# Patient Record
Sex: MALE | Race: WHITE | NOT HISPANIC OR LATINO | URBAN - METROPOLITAN AREA
[De-identification: names, ages, dates, MRNs, and addresses within clinical notes are randomized per-mention and may not be internally consistent; named-entity substitution may affect disease eponyms.]

---

## 2022-06-02 ENCOUNTER — APPOINTMENT (OUTPATIENT)
Age: 67
Setting detail: DERMATOLOGY
End: 2022-06-07

## 2022-06-02 VITALS — SYSTOLIC BLOOD PRESSURE: 134 MMHG | DIASTOLIC BLOOD PRESSURE: 86 MMHG

## 2022-06-02 DIAGNOSIS — R03.0 ELEVATED BLOOD-PRESSURE READING, WITHOUT DIAGNOSIS OF HYPERTENSION: ICD-10-CM

## 2022-06-02 DIAGNOSIS — L82.0 INFLAMED SEBORRHEIC KERATOSIS: ICD-10-CM

## 2022-06-02 DIAGNOSIS — L82.1 OTHER SEBORRHEIC KERATOSIS: ICD-10-CM

## 2022-06-02 DIAGNOSIS — L57.8 OTHER SKIN CHANGES DUE TO CHRONIC EXPOSURE TO NONIONIZING RADIATION: ICD-10-CM

## 2022-06-02 DIAGNOSIS — D485 NEOPLASM OF UNCERTAIN BEHAVIOR OF SKIN: ICD-10-CM

## 2022-06-02 PROBLEM — D48.5 NEOPLASM OF UNCERTAIN BEHAVIOR OF SKIN: Status: ACTIVE | Noted: 2022-06-02

## 2022-06-02 PROCEDURE — 11301 SHAVE SKIN LESION 0.6-1.0 CM: CPT

## 2022-06-02 PROCEDURE — OTHER DEFER: OTHER

## 2022-06-02 PROCEDURE — OTHER BENIGN DESTRUCTION: OTHER

## 2022-06-02 PROCEDURE — 99203 OFFICE O/P NEW LOW 30 MIN: CPT | Mod: 25

## 2022-06-02 PROCEDURE — 17110 DESTRUCT B9 LESION 1-14: CPT | Mod: 59

## 2022-06-02 PROCEDURE — OTHER COUNSELING: OTHER

## 2022-06-02 PROCEDURE — OTHER SHAVE REMOVAL: OTHER

## 2022-06-02 PROCEDURE — OTHER MIPS QUALITY: OTHER

## 2022-06-02 ASSESSMENT — LOCATION SIMPLE DESCRIPTION DERM
LOCATION SIMPLE: POSTERIOR NECK
LOCATION SIMPLE: LEFT HAND
LOCATION SIMPLE: RIGHT UPPER BACK
LOCATION SIMPLE: LEFT CLAVICULAR SKIN
LOCATION SIMPLE: RIGHT TEMPLE
LOCATION SIMPLE: RIGHT SHOULDER

## 2022-06-02 ASSESSMENT — LOCATION ZONE DERM
LOCATION ZONE: TRUNK
LOCATION ZONE: NECK
LOCATION ZONE: HAND
LOCATION ZONE: FACE
LOCATION ZONE: ARM

## 2022-06-02 ASSESSMENT — LOCATION DETAILED DESCRIPTION DERM
LOCATION DETAILED: RIGHT POSTERIOR SHOULDER
LOCATION DETAILED: RIGHT CENTRAL TEMPLE
LOCATION DETAILED: RIGHT SUPERIOR MEDIAL UPPER BACK
LOCATION DETAILED: LEFT CLAVICULAR SKIN
LOCATION DETAILED: LEFT RADIAL DORSAL HAND
LOCATION DETAILED: LEFT MEDIAL TRAPEZIAL NECK

## 2022-06-02 NOTE — PROCEDURE: MIPS QUALITY
Quality 226: Preventive Care And Screening: Tobacco Use: Screening And Cessation Intervention: Patient screened for tobacco use and is an ex/non-smoker
Detail Level: Detailed
Quality 110: Preventive Care And Screening: Influenza Immunization: Influenza Immunization Administered during Influenza season
Quality 111:Pneumonia Vaccination Status For Older Adults: Pneumococcal vaccine was not administered on or after patient’s 60th birthday and before the end of the measurement period, reason not otherwise specified
Quality 130: Documentation Of Current Medications In The Medical Record: Current Medications Documented

## 2022-06-02 NOTE — PROCEDURE: SHAVE REMOVAL
Medical Necessity Clause: This procedure was medically necessary because the lesion that was treated was:
Bill For Surgical Tray: no
Anesthesia Type: 1% lidocaine with epinephrine
Detail Level: Detailed
Biopsy Method: Dermablade
Post-Care Instructions: I reviewed with the patient in detail post-care instructions. Patient is to keep the biopsy site dry overnight, and then apply bacitracin twice daily until healed. Patient may apply hydrogen peroxide soaks to remove any crusting.
Wound Care: Petrolatum
Size Of Lesion In Cm (Required): 0.6
Notification Instructions: Patient will be notified of pathology results. However, patient instructed to call the office if not contacted within 2 weeks.
Hemostasis: Drysol
Billing Type: Third-Party Bill
Was A Bandage Applied: Yes
Medical Necessity Information: It is in your best interest to select a reason for this procedure from the list below. All of these items fulfill various CMS LCD requirements except the new and changing color options.
Consent was obtained from the patient. The risks and benefits to therapy were discussed in detail. Specifically, the risks of infection, scarring, bleeding, prolonged wound healing, incomplete removal, allergy to anesthesia, nerve injury and recurrence were addressed. Prior to the procedure, the treatment site was clearly identified and confirmed by the patient. All components of Universal Protocol/PAUSE Rule completed.

## 2022-06-02 NOTE — PROCEDURE: BENIGN DESTRUCTION
Add 52 Modifier (Optional): no
Medical Necessity Information: It is in your best interest to select a reason for this procedure from the list below. All of these items fulfill various CMS LCD requirements except the new and changing color options.
Medical Necessity Clause: This procedure was medically necessary because the lesions that were treated were:
Treatment Number (Will Not Render If 0): 0
Post-Care Instructions: I reviewed with the patient in detail post-care instructions. Patient is to wear sunprotection, and avoid picking at any of the treated lesions. Pt may apply Vaseline to crusted or scabbing areas.
Detail Level: Detailed
Consent: The patient's consent was obtained including but not limited to risks of crusting, scabbing, blistering, scarring, darker or lighter pigmentary change, recurrence, incomplete removal and infection.

## 2023-06-29 ENCOUNTER — APPOINTMENT (OUTPATIENT)
Dept: URBAN - METROPOLITAN AREA CLINIC 228 | Age: 68
Setting detail: DERMATOLOGY
End: 2023-07-01

## 2023-06-29 DIAGNOSIS — D22 MELANOCYTIC NEVI: ICD-10-CM

## 2023-06-29 DIAGNOSIS — L57.8 OTHER SKIN CHANGES DUE TO CHRONIC EXPOSURE TO NONIONIZING RADIATION: ICD-10-CM

## 2023-06-29 DIAGNOSIS — D49.2 NEOPLASM OF UNSPECIFIED BEHAVIOR OF BONE, SOFT TISSUE, AND SKIN: ICD-10-CM

## 2023-06-29 DIAGNOSIS — L72.0 EPIDERMAL CYST: ICD-10-CM

## 2023-06-29 PROBLEM — D22.5 MELANOCYTIC NEVI OF TRUNK: Status: ACTIVE | Noted: 2023-06-29

## 2023-06-29 PROCEDURE — OTHER BIOPSY BY SHAVE METHOD: OTHER

## 2023-06-29 PROCEDURE — OTHER COUNSELING: OTHER

## 2023-06-29 PROCEDURE — OTHER MIPS QUALITY: OTHER

## 2023-06-29 PROCEDURE — OTHER REASSURANCE: OTHER

## 2023-06-29 PROCEDURE — 11102 TANGNTL BX SKIN SINGLE LES: CPT

## 2023-06-29 PROCEDURE — 99213 OFFICE O/P EST LOW 20 MIN: CPT | Mod: 25

## 2023-06-29 ASSESSMENT — LOCATION ZONE DERM
LOCATION ZONE: NECK
LOCATION ZONE: SCALP
LOCATION ZONE: ARM
LOCATION ZONE: TRUNK

## 2023-06-29 ASSESSMENT — LOCATION DETAILED DESCRIPTION DERM
LOCATION DETAILED: LEFT CLAVICULAR NECK
LOCATION DETAILED: RIGHT MEDIAL BUTTOCK
LOCATION DETAILED: LEFT CENTRAL PARIETAL SCALP
LOCATION DETAILED: LEFT POSTERIOR SHOULDER
LOCATION DETAILED: LEFT MEDIAL TRAPEZIAL NECK
LOCATION DETAILED: RIGHT POSTERIOR SHOULDER

## 2023-06-29 ASSESSMENT — LOCATION SIMPLE DESCRIPTION DERM
LOCATION SIMPLE: RIGHT SHOULDER
LOCATION SIMPLE: LEFT SHOULDER
LOCATION SIMPLE: POSTERIOR NECK
LOCATION SIMPLE: RIGHT BUTTOCK
LOCATION SIMPLE: SCALP
LOCATION SIMPLE: LEFT ANTERIOR NECK

## 2023-06-29 NOTE — PROCEDURE: MIPS QUALITY
Quality 226: Preventive Care And Screening: Tobacco Use: Screening And Cessation Intervention: Patient screened for tobacco use and is an ex/non-smoker
Quality 47: Advance Care Plan: Advance Care Planning discussed and documented; advance care plan or surrogate decision maker documented in the medical record.
Quality 431: Preventive Care And Screening: Unhealthy Alcohol Use - Screening: Patient not identified as an unhealthy alcohol user when screened for unhealthy alcohol use using a systematic screening method
Detail Level: Detailed
Quality 110: Preventive Care And Screening: Influenza Immunization: Influenza Immunization Administered during Influenza season
Quality 111:Pneumonia Vaccination Status For Older Adults: Patient received any pneumococcal conjugate or polysaccharide vaccine on or after their 60th birthday and before the end of the measurement period
Quality 130: Documentation Of Current Medications In The Medical Record: Current Medications Documented

## 2024-06-27 ENCOUNTER — APPOINTMENT (RX ONLY)
Dept: URBAN - METROPOLITAN AREA CLINIC 176 | Facility: CLINIC | Age: 69
Setting detail: DERMATOLOGY
End: 2024-06-27

## 2024-06-27 DIAGNOSIS — L82.1 OTHER SEBORRHEIC KERATOSIS: ICD-10-CM

## 2024-06-27 DIAGNOSIS — L81.4 OTHER MELANIN HYPERPIGMENTATION: ICD-10-CM

## 2024-06-27 DIAGNOSIS — D18.0 HEMANGIOMA: ICD-10-CM

## 2024-06-27 DIAGNOSIS — D17 BENIGN LIPOMATOUS NEOPLASM: ICD-10-CM

## 2024-06-27 DIAGNOSIS — D22 MELANOCYTIC NEVI: ICD-10-CM

## 2024-06-27 PROBLEM — D22.5 MELANOCYTIC NEVI OF TRUNK: Status: ACTIVE | Noted: 2024-06-27

## 2024-06-27 PROBLEM — D17.1 BENIGN LIPOMATOUS NEOPLASM OF SKIN AND SUBCUTANEOUS TISSUE OF TRUNK: Status: ACTIVE | Noted: 2024-06-27

## 2024-06-27 PROBLEM — D18.01 HEMANGIOMA OF SKIN AND SUBCUTANEOUS TISSUE: Status: ACTIVE | Noted: 2024-06-27

## 2024-06-27 PROCEDURE — 99202 OFFICE O/P NEW SF 15 MIN: CPT

## 2024-06-27 PROCEDURE — ? COUNSELING

## 2024-06-27 ASSESSMENT — LOCATION SIMPLE DESCRIPTION DERM
LOCATION SIMPLE: RIGHT UPPER BACK
LOCATION SIMPLE: LEFT UPPER BACK
LOCATION SIMPLE: ABDOMEN
LOCATION SIMPLE: CHEST

## 2024-06-27 ASSESSMENT — LOCATION DETAILED DESCRIPTION DERM
LOCATION DETAILED: LEFT SUPERIOR UPPER BACK
LOCATION DETAILED: LEFT LATERAL UPPER BACK
LOCATION DETAILED: RIGHT SUPERIOR UPPER BACK
LOCATION DETAILED: EPIGASTRIC SKIN
LOCATION DETAILED: LEFT INFERIOR UPPER BACK
LOCATION DETAILED: LEFT MEDIAL SUPERIOR CHEST

## 2024-06-27 ASSESSMENT — LOCATION ZONE DERM: LOCATION ZONE: TRUNK

## 2025-06-26 ENCOUNTER — APPOINTMENT (OUTPATIENT)
Dept: URBAN - METROPOLITAN AREA CLINIC 176 | Facility: CLINIC | Age: 70
Setting detail: DERMATOLOGY
End: 2025-06-26

## 2025-06-26 VITALS — HEIGHT: 68 IN | WEIGHT: 155 LBS

## 2025-06-26 DIAGNOSIS — L21.8 OTHER SEBORRHEIC DERMATITIS: ICD-10-CM

## 2025-06-26 DIAGNOSIS — L81.4 OTHER MELANIN HYPERPIGMENTATION: ICD-10-CM

## 2025-06-26 DIAGNOSIS — D18.0 HEMANGIOMA: ICD-10-CM

## 2025-06-26 DIAGNOSIS — D22 MELANOCYTIC NEVI: ICD-10-CM

## 2025-06-26 DIAGNOSIS — L82.1 OTHER SEBORRHEIC KERATOSIS: ICD-10-CM

## 2025-06-26 DIAGNOSIS — D17 BENIGN LIPOMATOUS NEOPLASM: ICD-10-CM

## 2025-06-26 PROBLEM — D17.1 BENIGN LIPOMATOUS NEOPLASM OF SKIN AND SUBCUTANEOUS TISSUE OF TRUNK: Status: ACTIVE | Noted: 2025-06-26

## 2025-06-26 PROBLEM — D22.5 MELANOCYTIC NEVI OF TRUNK: Status: ACTIVE | Noted: 2025-06-26

## 2025-06-26 PROBLEM — D18.01 HEMANGIOMA OF SKIN AND SUBCUTANEOUS TISSUE: Status: ACTIVE | Noted: 2025-06-26

## 2025-06-26 PROCEDURE — ? BENIGN DESTRUCTION COSMETIC

## 2025-06-26 PROCEDURE — ? COUNSELING

## 2025-06-26 ASSESSMENT — LOCATION DETAILED DESCRIPTION DERM
LOCATION DETAILED: LEFT MEDIAL SUPERIOR CHEST
LOCATION DETAILED: RIGHT BUTTOCK
LOCATION DETAILED: LEFT MID TEMPLE
LOCATION DETAILED: RIGHT MEDIAL FOREHEAD
LOCATION DETAILED: LEFT INFERIOR UPPER BACK
LOCATION DETAILED: RIGHT CENTRAL PARIETAL SCALP
LOCATION DETAILED: RIGHT SUPERIOR UPPER BACK
LOCATION DETAILED: LEFT LATERAL UPPER BACK
LOCATION DETAILED: RIGHT SUPERIOR TEMPLE
LOCATION DETAILED: LEFT SUPERIOR UPPER BACK
LOCATION DETAILED: EPIGASTRIC SKIN
LOCATION DETAILED: PERIUMBILICAL SKIN

## 2025-06-26 ASSESSMENT — LOCATION ZONE DERM
LOCATION ZONE: TRUNK
LOCATION ZONE: FACE
LOCATION ZONE: SCALP

## 2025-06-26 ASSESSMENT — LOCATION SIMPLE DESCRIPTION DERM
LOCATION SIMPLE: LEFT UPPER BACK
LOCATION SIMPLE: RIGHT UPPER BACK
LOCATION SIMPLE: RIGHT FOREHEAD
LOCATION SIMPLE: RIGHT BUTTOCK
LOCATION SIMPLE: CHEST
LOCATION SIMPLE: RIGHT TEMPLE
LOCATION SIMPLE: LEFT TEMPLE
LOCATION SIMPLE: ABDOMEN
LOCATION SIMPLE: SCALP

## 2025-06-26 NOTE — PROCEDURE: BENIGN DESTRUCTION COSMETIC
Price (Use Numbers Only, No Special Characters Or $): 110
Post-Care Instructions: I reviewed with the patient in detail post-care instructions. Patient is to wear sunprotection, and avoid picking at any of the treated lesions. Pt may apply Vaseline to crusted or scabbing areas.
Anesthesia Volume In Cc: 0.5
Consent: The patient's consent was obtained including but not limited to risks of crusting, scabbing, blistering, scarring, darker or lighter pigmentary change, recurrence, incomplete removal and infection.
Detail Level: Detailed